# Patient Record
Sex: FEMALE | Race: OTHER | Employment: OTHER | URBAN - METROPOLITAN AREA
[De-identification: names, ages, dates, MRNs, and addresses within clinical notes are randomized per-mention and may not be internally consistent; named-entity substitution may affect disease eponyms.]

---

## 2024-05-07 ENCOUNTER — OFFICE VISIT (OUTPATIENT)
Age: 84
End: 2024-05-07
Payer: COMMERCIAL

## 2024-05-07 VITALS
SYSTOLIC BLOOD PRESSURE: 120 MMHG | DIASTOLIC BLOOD PRESSURE: 70 MMHG | BODY MASS INDEX: 26.43 KG/M2 | HEIGHT: 61 IN | WEIGHT: 140 LBS | OXYGEN SATURATION: 99 % | HEART RATE: 75 BPM

## 2024-05-07 DIAGNOSIS — R26.81 GAIT INSTABILITY: ICD-10-CM

## 2024-05-07 DIAGNOSIS — I87.2 VENOUS INSUFFICIENCY OF BOTH LOWER EXTREMITIES: ICD-10-CM

## 2024-05-07 DIAGNOSIS — M25.561 CHRONIC PAIN OF BOTH KNEES: ICD-10-CM

## 2024-05-07 DIAGNOSIS — I35.8 AORTIC HEART MURMUR: ICD-10-CM

## 2024-05-07 DIAGNOSIS — M25.562 CHRONIC PAIN OF BOTH KNEES: ICD-10-CM

## 2024-05-07 DIAGNOSIS — G89.29 CHRONIC PAIN OF BOTH SHOULDERS: ICD-10-CM

## 2024-05-07 DIAGNOSIS — M25.512 CHRONIC PAIN OF BOTH SHOULDERS: ICD-10-CM

## 2024-05-07 DIAGNOSIS — M25.511 CHRONIC PAIN OF BOTH SHOULDERS: ICD-10-CM

## 2024-05-07 DIAGNOSIS — R29.898 HEAVY SENSATION OF LOWER EXTREMITY: ICD-10-CM

## 2024-05-07 DIAGNOSIS — E55.9 VITAMIN D DEFICIENCY: Primary | ICD-10-CM

## 2024-05-07 DIAGNOSIS — M15.9 PRIMARY OSTEOARTHRITIS INVOLVING MULTIPLE JOINTS: ICD-10-CM

## 2024-05-07 DIAGNOSIS — G89.29 CHRONIC PAIN OF BOTH KNEES: ICD-10-CM

## 2024-05-07 DIAGNOSIS — M79.10 MYALGIA: ICD-10-CM

## 2024-05-07 PROCEDURE — 99214 OFFICE O/P EST MOD 30 MIN: CPT | Performed by: INTERNAL MEDICINE

## 2024-05-07 PROCEDURE — 1123F ACP DISCUSS/DSCN MKR DOCD: CPT | Performed by: INTERNAL MEDICINE

## 2024-05-07 NOTE — PROGRESS NOTES
Chief Complaint   Patient presents with    Follow-up     \"Have you been to the ER, urgent care clinic since your last visit?  Hospitalized since your last visit?\"    NO    “Have you seen or consulted any other health care providers outside of [facility] since your last visit?”    NO            Click Here for Release of Records Request

## 2024-05-20 NOTE — PROGRESS NOTES
Subjective    Maci Cai is a 83 y.o. female who presents today for the following:  Chief Complaint   Patient presents with    Follow-up       History of Present Illness  The patient is an 83-year-old female who presents for evaluation of multiple medical concerns. She is accompanied by a .    The patient reports an improvement in her overall well-being. However, she discontinued the use of amitriptyline, which she believes has contributed to her improved sleep quality. However, she continues to experience some daytime sleepiness. Her bedtime routine involves taking her medication at 8:00 PM, falling asleep at 9:30 PM, and waking up around 7:00 AM. She typically awakens around 9:00 AM to use the bathroom, after which she returns to bed and falls asleep again. Upon waking, she remains active, but experiences fatigue as the day progresses. She expresses a dislike for daytime napping. Her facial appearance has improved, attributing this to her improved sleep quality.    Recent vitamin D was close to 20.  The patient commenced a regimen of vitamin D supplements yesterday.     She reports persistent coldness and a sensation of heaviness in her legs. She has previously engaged in physical therapy at [facility], where she occasionally received massages, which she finds beneficial. However, she continues to experience shoulder and knee pain, which restricts her arm mobility. The patient has a history of active arthritis.    PMH/PSH/Allergies/Social History/medication list and most recent studies reviewed with patient.  Potassium 5.3.  BUN 35.  Vitamin D 20.  All other labs are stable.    Reports compliance with medications and diet. Trying to be active physically to control weight. Reports no other new c/o.     Social History     Tobacco Use   Smoking Status Never   Smokeless Tobacco Never     Social History     Substance and Sexual Activity   Alcohol Use Not Currently         History reviewed. No pertinent

## 2024-06-03 ENCOUNTER — HOSPITAL ENCOUNTER (OUTPATIENT)
Facility: HOSPITAL | Age: 84
Setting detail: RECURRING SERIES
Discharge: HOME | End: 2024-06-06
Payer: COMMERCIAL

## 2024-06-03 PROCEDURE — 97161 PT EVAL LOW COMPLEX 20 MIN: CPT

## 2024-06-03 PROCEDURE — 97535 SELF CARE MNGMENT TRAINING: CPT

## 2024-06-03 NOTE — THERAPY EVALUATION
tibial-femoral joint.  Pain with testing.     Objective/Functional Outcome Measure: see FOTO  FOTO Score: see FOTO   FOTO score = an established functional score where 100 = no disability      45 min [x]Eval - untimed                   Therapeutic Procedures:    15  01680 Self Care/Home Management (timed):  improve patient knowledge and understanding of pain reducing techniques, home safety, activity modification, diagnosis/prognosis, and physical therapy expectations, procedures and progression  to improve patient's ability to progress to PLOF and address remaining functional goals.  (see flow sheet as applicable)     Details if applicable:  Advised aquatic therapy trial at [facility] 1-2 times weekly-consult re: benefits of aquatic therapy for mobility and dec LE swelling.  Purchase rollator for use in community    Adjusted height of cane, lowering 2 inches.  Advised SPC >quad cane due to pt's unablity to make contact with all 4 points  Advised massage at [facility] for overall pain management and wellness  Home exercise program to be given at next session.        Skin assessment post-treatment (if applicable):    [x]  intact    []  redness- no adverse reaction                 []redness - adverse reaction:          [x]  Patient Education billed concurrently with other procedures   [x] Review HEP      Pain Level at end of session (0-10 scale): 5    Plan of Care / Statement of Necessity for Physical Therapy Services     Assessment / key information:  Pt presents with yuliana LE pain L>R with sig weakness and balance deficits and will benefit from PT to address deficits as noted below in problem list    Evaluation Complexity:  History:  LOW Complexity : Zero comorbidities / personal factors that will impact the outcome / POC; Examination:  LOW Complexity : 1-2 Standardized tests and measures addressing body structure, function, activity limitation and / or participation in recreation  ;Presentation:  LOW Complexity : Stable,

## 2024-06-06 ENCOUNTER — HOSPITAL ENCOUNTER (OUTPATIENT)
Facility: HOSPITAL | Age: 84
Setting detail: RECURRING SERIES
Discharge: HOME | End: 2024-06-09
Payer: COMMERCIAL

## 2024-06-06 PROCEDURE — 97110 THERAPEUTIC EXERCISES: CPT

## 2024-06-06 PROCEDURE — 97112 NEUROMUSCULAR REEDUCATION: CPT

## 2024-06-06 NOTE — PROGRESS NOTES
PHYSICAL THERAPY - DAILY TREATMENT NOTE (updated 3/23)      Date: 2024          Patient Name:  Maci Cai :  1940   Medical   Diagnosis:  Venous insufficiency of both lower extremities [I87.2]  Gait instability [R26.81]  Primary osteoarthritis involving multiple joints [M15.9]  Myalgia [M79.10]  Heavy sensation of lower extremity [R29.898] Treatment Diagnosis:  M25.551  RIGHT HIP PAIN, M25.552  LEFT HIP PAIN, M25.561  RIGHT KNEE PAIN, M25.562  LEFT KNEE PAIN, M79.661  Pain in right lower leg, and M79.662  Pain in left lower leg    Referral Source:  Mitch Holland DO Insurance:   [facility] / Plan: [email or web address] [facility] / Product Type: *No Product type* /                     Patient  verified yes     Visit #   Current  / Total 2 24   Time   In / Out 1215 P 1250 P   Total Treatment Time 35   Total Timed Codes 35         SUBJECTIVE    Pain Level (0-10 scale): Rating not captured, pain in LE's    Any medication changes, allergies to medications, adverse drug reactions, diagnosis change, or new procedure performed?: [x] No    [] Yes (see summary sheet for update)  Medications: Verified on Patient Summary List    Subjective functional status/changes:     Pt reports her LE's hurt from sitting up front. Pt and pt's family member report they have a SPC at home.     OBJECTIVE      Therapeutic Procedures:  Tx Min Billable or 1:1 Min (if diff from Tx Min) Procedure, Rationale, Specifics   25  06036 Therapeutic Exercise (timed):  increase ROM, strength, coordination, balance, and proprioception to improve patient's ability to progress to PLOF and address remaining functional goals. (see flow sheet as applicable)     Details if applicable:  established HEP   10  58658 Neuromuscular Re-Education (timed):  improve balance, coordination, kinesthetic sense, posture, core stability and proprioception to improve patient's ability to develop conscious control of individual muscles and awareness of position of

## 2024-06-13 ENCOUNTER — HOSPITAL ENCOUNTER (OUTPATIENT)
Facility: HOSPITAL | Age: 84
Setting detail: RECURRING SERIES
Discharge: HOME | End: 2024-06-16
Payer: COMMERCIAL

## 2024-06-13 PROCEDURE — 97112 NEUROMUSCULAR REEDUCATION: CPT

## 2024-06-13 PROCEDURE — 97110 THERAPEUTIC EXERCISES: CPT

## 2024-06-13 NOTE — PROGRESS NOTES
PHYSICAL THERAPY - DAILY TREATMENT NOTE (updated 3/23)      Date: 2024          Patient Name:  Maci Cai :  1940   Medical   Diagnosis:  Venous insufficiency of both lower extremities [I87.2]  Gait instability [R26.81]  Primary osteoarthritis involving multiple joints [M15.9]  Myalgia [M79.10]  Heavy sensation of lower extremity [R29.898] Treatment Diagnosis:  M25.551  RIGHT HIP PAIN, M25.552  LEFT HIP PAIN, M25.561  RIGHT KNEE PAIN, M25.562  LEFT KNEE PAIN, M79.661  Pain in right lower leg, and M79.662  Pain in left lower leg    Referral Source:  Mitch Holland DO Insurance:   [facility] / [facility]: [employer] [facility] / Product Type: *No Product type* /                     Patient  verified yes     Visit #   Current  / Total 3 24   Time   In / Out 9:35 A 10:20 A   Total Treatment Time 45   Total Timed Codes 45         SUBJECTIVE    Pain Level (0-10 scale): 0/10 LE's.  10/10 bilateral shoulders.    Any medication changes, allergies to medications, adverse drug reactions, diagnosis change, or new procedure performed?: [x] No    [] Yes (see summary sheet for update)  Medications: Verified on Patient Summary List    Subjective functional status/changes:     Pt reports her legs are feeling good today. Has bilateral shoulder pain when first waking up in the morning, but feels better once she gets up and moves. Pt's daughter present to assist with translation as needed.    OBJECTIVE      Therapeutic Procedures:  Tx Min Billable or 1:1 Min (if diff from Tx Min) Procedure, Rationale, Specifics   30  06712 Therapeutic Exercise (timed):  increase ROM, strength, coordination, balance, and proprioception to improve patient's ability to progress to PLOF and address remaining functional goals. (see flow sheet as applicable)     Details if applicable:  established HEP   15  27205 Neuromuscular Re-Education (timed):  improve balance, coordination, kinesthetic sense, posture, core stability and

## 2024-06-25 DIAGNOSIS — R29.898 HEAVY SENSATION OF LOWER EXTREMITY: Primary | ICD-10-CM

## 2024-06-25 RX ORDER — AMITRIPTYLINE HYDROCHLORIDE 10 MG/1
10 TABLET, FILM COATED ORAL NIGHTLY
Qty: 30 TABLET | Refills: 1 | Status: SHIPPED | OUTPATIENT
Start: 2024-06-25